# Patient Record
Sex: MALE | Race: WHITE | NOT HISPANIC OR LATINO | Employment: OTHER | URBAN - METROPOLITAN AREA
[De-identification: names, ages, dates, MRNs, and addresses within clinical notes are randomized per-mention and may not be internally consistent; named-entity substitution may affect disease eponyms.]

---

## 2020-02-21 ENCOUNTER — TRANSCRIBE ORDERS (OUTPATIENT)
Dept: ADMINISTRATIVE | Facility: HOSPITAL | Age: 30
End: 2020-02-21

## 2020-02-21 DIAGNOSIS — M25.561 RIGHT KNEE PAIN, UNSPECIFIED CHRONICITY: Primary | ICD-10-CM

## 2020-02-26 VITALS — DIASTOLIC BLOOD PRESSURE: 75 MMHG | HEART RATE: 62 BPM | HEIGHT: 63 IN | SYSTOLIC BLOOD PRESSURE: 143 MMHG

## 2020-02-26 DIAGNOSIS — S81.811A LACERATION OF RIGHT LOWER EXTREMITY, INITIAL ENCOUNTER: ICD-10-CM

## 2020-02-26 DIAGNOSIS — M25.361 KNEE INSTABILITY, RIGHT: Primary | ICD-10-CM

## 2020-02-26 PROCEDURE — 99203 OFFICE O/P NEW LOW 30 MIN: CPT | Performed by: ORTHOPAEDIC SURGERY

## 2020-02-26 RX ORDER — OXYCODONE HYDROCHLORIDE AND ACETAMINOPHEN 5; 325 MG/1; MG/1
TABLET ORAL
COMMUNITY
Start: 2020-02-19

## 2020-02-26 RX ORDER — CEPHALEXIN 500 MG/1
CAPSULE ORAL
COMMUNITY
Start: 2020-02-19

## 2020-02-26 RX ORDER — IBUPROFEN 600 MG/1
600 TABLET ORAL EVERY 6 HOURS PRN
COMMUNITY
Start: 2020-02-19

## 2020-02-26 NOTE — PROGRESS NOTES
Assessment:  1  Knee instability, right  MRI knee right  wo contrast   2  Laceration of right lower extremity, initial encounter       There is no problem list on file for this patient  Cabrera Blount presents in the office today with right knee instability after a fall from a ladder x 1 week ago  A MRI of the right knee was ordered to evaluated for a ACL tear  Incision site with sutures intact is present and stable, he will make a follow up appointment with ED to have sutures removed  Rest, ice and elevation advised  He can take NSAIDS for pain control as needed  Avoid high impact activities until seen back in the office after MRI of the right knee  Subjective:     Patient ID:    Chief Sandi Castro 26 y o  male      HPI    Patient comes in today with regards to right knee pain  He states he fell approximately 8-9 feet from a ladder landing on his left side and banging his right medial knee  The patient reports that the pain is in the medial knee and has been going on for 1 week  The pain is rated at0 at its best and4 at its worst   The pain is described as soreness  It is worsened with weight bearing and flexion, and is made better with rest   The patient has taken Ibuprofen and Hydrocodone for treatment        The following portions of the patient's history were reviewed and updated as appropriate: allergies, current medications, past family history, past social history, past surgical history and problem list         Social History     Socioeconomic History    Marital status: Single     Spouse name: Not on file    Number of children: Not on file    Years of education: Not on file    Highest education level: Not on file   Occupational History    Not on file   Social Needs    Financial resource strain: Not on file    Food insecurity:     Worry: Not on file     Inability: Not on file    Transportation needs:     Medical: Not on file     Non-medical: Not on file Tobacco Use    Smoking status: Current Every Day Smoker    Smokeless tobacco: Never Used   Substance and Sexual Activity    Alcohol use: Not on file    Drug use: Not on file    Sexual activity: Not on file   Lifestyle    Physical activity:     Days per week: Not on file     Minutes per session: Not on file    Stress: Not on file   Relationships    Social connections:     Talks on phone: Not on file     Gets together: Not on file     Attends Taoist service: Not on file     Active member of club or organization: Not on file     Attends meetings of clubs or organizations: Not on file     Relationship status: Not on file    Intimate partner violence:     Fear of current or ex partner: Not on file     Emotionally abused: Not on file     Physically abused: Not on file     Forced sexual activity: Not on file   Other Topics Concern    Not on file   Social History Narrative    Not on file     No past medical history on file  No past surgical history on file  No Known Allergies  Current Outpatient Medications on File Prior to Visit   Medication Sig Dispense Refill    cephalexin (KEFLEX) 500 mg capsule TAKE 1 CAPSULE BY MOUTH BY MOUTH 4 TIMES A DAY 7 DAYS      ibuprofen (MOTRIN) 600 mg tablet Take 600 mg by mouth every 6 (six) hours as needed      oxyCODONE-acetaminophen (PERCOCET) 5-325 mg per tablet TAKE 1 TABLET BY MOUTH EVERY 4 HOURS FOR 3 DAYS AS NEEDED FOR PAIN  AVOID OTHER SEDATIVES  No current facility-administered medications on file prior to visit  Objective:    Review of Systems   Constitutional: Negative  HENT: Negative  Eyes: Negative  Respiratory: Negative  Cardiovascular: Negative  Gastrointestinal: Negative  Endocrine: Negative  Genitourinary: Negative  Musculoskeletal: Positive for arthralgias, joint swelling and myalgias  Skin: Negative  Allergic/Immunologic: Negative  Neurological: Negative  Hematological: Negative  Psychiatric/Behavioral: Negative  Right Knee Exam     Muscle Strength   The patient has normal right knee strength  Tenderness   The patient is experiencing tenderness in the medial joint line  Range of Motion   Extension: 0   Flexion: 130 (no crepitus with rom )     Tests   Jonny:  Medial - negative Lateral - negative  Varus: negative Valgus: negative  Lachman:  Right knee anterior Lachman test: slight laxity present with end point  Drawer:  Right knee anterior drawer test: slight laxity present with end point  Posterior - negative    Other   Erythema: absent  Scars: present  Sensation: normal  Pulse: present  Swelling: none  Effusion: no effusion present    Comments:  Bounce Test:  Negative  Incision site:  Medial proximal tibia - presents CDI with sutures intact  No drainage, no dehiscence, no erythema, no sign of infection  Physical Exam   Constitutional: He is oriented to person, place, and time  He appears well-developed  HENT:   Head: Normocephalic  Eyes: Conjunctivae are normal    Neck: Neck supple  Cardiovascular: Intact distal pulses  Pulmonary/Chest: Effort normal    Musculoskeletal:        Right knee: He exhibits no effusion  Neurological: He is alert and oriented to person, place, and time  Skin: Skin is warm and dry  Psychiatric: He has a normal mood and affect  Procedures  No Procedures performed today    I have personally reviewed pertinent films in PACS and my interpretation is Xray Right Knee:  no fracture or dislocation present       Scribe Attestation    I,:   Snow Alvarenga am acting as a scribe while in the presence of the attending physician :        I,:   Casimer Ormond, DO personally performed the services described in this documentation    as scribed in my presence :          Portions of the record may have been created with voice recognition software   Occasional wrong word or "sound a like" substitutions may have occurred due to the inherent limitations of voice recognition software   Read the chart carefully and recognize, using context, where substitutions have occurred

## 2020-03-04 ENCOUNTER — HOSPITAL ENCOUNTER (OUTPATIENT)
Dept: RADIOLOGY | Facility: HOSPITAL | Age: 30
Discharge: HOME | End: 2020-03-04
Payer: COMMERCIAL

## 2020-03-04 DIAGNOSIS — M25.561 RIGHT KNEE PAIN, UNSPECIFIED CHRONICITY: ICD-10-CM

## 2020-03-04 DIAGNOSIS — M25.361 KNEE INSTABILITY, RIGHT: ICD-10-CM

## 2020-03-04 PROCEDURE — 73721 MRI JNT OF LWR EXTRE W/O DYE: CPT

## 2020-03-05 ENCOUNTER — TELEPHONE (OUTPATIENT)
Dept: OBGYN CLINIC | Facility: HOSPITAL | Age: 30
End: 2020-03-05

## 2020-03-05 NOTE — TELEPHONE ENCOUNTER
Thank you Jessi Rolon left a very detailed message on his voicemail his other phone does not work so I do not want a play Cat and mouse with him with phone calls  Just please have him come in so we can go over the results  Anybody who read this when the patient calls in please refer to the MRI results for my details

## 2020-03-05 NOTE — TELEPHONE ENCOUNTER
Patient of DR kirk  Re: Returning call  CB# [phone]    Patient states he received a call but no message was left   Unsure who called the patient or if it has reference to his MRI

## 2020-03-05 NOTE — TELEPHONE ENCOUNTER
LM on VM for patient to call back  There is a note in the MRI results that can be relayed to the patient and he needs to be scheduled with Dr Parker for review of the MRI  Please reach out to me when he calls backs so I can relay and schedule   Thank you

## 2020-03-09 ENCOUNTER — OFFICE VISIT (OUTPATIENT)
Dept: OBGYN CLINIC | Facility: CLINIC | Age: 30
End: 2020-03-09

## 2020-03-09 VITALS
HEIGHT: 63 IN | BODY MASS INDEX: 26.93 KG/M2 | DIASTOLIC BLOOD PRESSURE: 77 MMHG | HEART RATE: 71 BPM | WEIGHT: 152 LBS | SYSTOLIC BLOOD PRESSURE: 127 MMHG

## 2020-03-09 DIAGNOSIS — S83.511D RUPTURE OF ANTERIOR CRUCIATE LIGAMENT OF RIGHT KNEE, SUBSEQUENT ENCOUNTER: ICD-10-CM

## 2020-03-09 DIAGNOSIS — T14.8XXA CONTUSION OF BONE: Primary | ICD-10-CM

## 2020-03-09 PROBLEM — S83.511A RUPTURE OF ANTERIOR CRUCIATE LIGAMENT OF RIGHT KNEE: Status: ACTIVE | Noted: 2020-03-09

## 2020-03-09 PROCEDURE — 99212 OFFICE O/P EST SF 10 MIN: CPT | Performed by: ORTHOPAEDIC SURGERY

## 2020-03-09 NOTE — PROGRESS NOTES
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  Otherwise there is no further follow-up for me less the pain persists  We also went over the bone edema and told him that it can take 6 months to a year to resolve on its own  If it continues to persist or it gets worse he will come back to see me  Subjective:     Patient ID:    Chief Merritt Nuhasunita 26 y o  male      HPI    Patient comes in today with regards to   Right knee  Patient had a fall subsequently had an MRI and he comes back today to review that MRI      The following portions of the patient's history were reviewed and updated as appropriate: allergies, current medications, past family history, past social history, past surgical history and problem list         Social History     Socioeconomic History    Marital status: Single     Spouse name: Not on file    Number of children: Not on file    Years of education: Not on file    Highest education level: Not on file   Occupational History    Not on file   Social Needs    Financial resource strain: Not on file    Food insecurity:     Worry: Not on file     Inability: Not on file    Transportation needs:     Medical: Not on file     Non-medical: Not on file   Tobacco Use    Smoking status: Current Every Day Smoker    Smokeless tobacco: Never Used   Substance and Sexual Activity    Alcohol use: Not on file    Drug use: Not on file    Sexual activity: Not on file   Lifestyle    Physical activity:     Days per week: Not on file     Minutes per session: Not on file    Stress: Not on file   Relationships    Social connections:     Talks on phone: Not on file     Gets together: Not on file     Attends Church service: Not on file     Active member of club or organization: Not on file     Attends meetings of clubs or organizations: Not on file     Relationship status: Not on file    Intimate partner violence:     Fear of current or ex partner: Not on file     Emotionally abused: Not on file     Physically abused: Not on file     Forced sexual activity: Not on file   Other Topics Concern    Not on file   Social History Narrative    Not on file     History reviewed  No pertinent past medical history  History reviewed  No pertinent surgical history  No Known Allergies  Current Outpatient Medications on File Prior to Visit   Medication Sig Dispense Refill    cephalexin (KEFLEX) 500 mg capsule TAKE 1 CAPSULE BY MOUTH BY MOUTH 4 TIMES A DAY 7 DAYS      ibuprofen (MOTRIN) 600 mg tablet Take 600 mg by mouth every 6 (six) hours as needed      oxyCODONE-acetaminophen (PERCOCET) 5-325 mg per tablet TAKE 1 TABLET BY MOUTH EVERY 4 HOURS FOR 3 DAYS AS NEEDED FOR PAIN  AVOID OTHER SEDATIVES  No current facility-administered medications on file prior to visit  Objective:    Review of Systems    Right Knee Exam     Muscle Strength   The patient has normal right knee strength  Tenderness   The patient is experiencing tenderness in the medial joint line  Range of Motion   Extension: 0   Flexion: 130 (no crepitus with rom )     Tests   Jonny:  Medial - negative Lateral - negative  Varus: negative Valgus: negative  Lachman:  Right knee anterior Lachman test: slight laxity present with end point  Drawer:  Right knee anterior drawer test: slight laxity present with end point      Posterior - negative    Other   Erythema: absent  Scars: present  Sensation: normal  Pulse: present  Swelling: none  Effusion: no effusion present    Comments:  Bounce Test: Negative  Incision site:  Medial proximal tibia - presents CDI with sutures intact  No drainage, no dehiscence, no erythema, no sign of infection  Physical Exam   Musculoskeletal:        Right knee: He exhibits no effusion  Procedures    The previous exam    I have personally reviewed pertinent films in PACS  Portions of the record may have been created with voice recognition software   Occasional wrong word or "sound a like" substitutions may have occurred due to the inherent limitations of voice recognition software   Read the chart carefully and recognize, using context, where substitutions have occurred

## 2020-03-30 ENCOUNTER — NURSE TRIAGE (OUTPATIENT)
Dept: OBGYN CLINIC | Facility: HOSPITAL | Age: 30
End: 2020-03-30

## 2020-03-30 NOTE — TELEPHONE ENCOUNTER
Reason for Disposition   [1] Caller concerned that exposure to COVID-19 occurred BUT [2] does not meet COVID-19 EXPOSURE criteria from [facility]    Answer Assessment - Initial Assessment Questions  1  CONFIRMED CASE: "Who is the person with the confirmed COVID-19 infection that you were exposed to?"     Unknown  2  PLACE of CONTACT: "Where were you when you were exposed to COVID-19  (coronavirus disease 2019)?" (e g , city, state, country)     Unknown  3  TYPE of CONTACT: "How much contact was there?" (e g , live in same house, work in same office, same school)     Unknown  4  DATE of CONTACT: "When did you have contact with a coronavirus patient?" (e g , days)      Unknown  5  DURATION of CONTACT: "How long were you in contact with the COVID-19 (coronavirus disease) patient?" (e g , a few seconds, passed by person, a few minutes, live with the patient)     unknown  6  SYMPTOMS: "Do you have any symptoms?" (e g , fever, cough, breathing difficulty)     Wet cough, some congestion, Fever waxes and wanes mostly at night  Denies SOB  7  PREGNANCY OR POSTPARTUM: "Is there any chance you are pregnant?" "When was your last menstrual period?" "Did you deliver in the last 2 weeks?"    N/A  8  HIGH RISK: "Do you have any heart or lung problems?  Do you have a weakened immune system?" (e g , CHF, COPD, asthma, HIV positive, chemotherapy, renal failure, diabetes mellitus, sickle cell anemia)     No history    Protocols used: CORONAVIRUS (COVID-19) EXPOSURE-ADULT-

## 2020-03-30 NOTE — TELEPHONE ENCOUNTER
Regarding: AARCJ-14 CONCERN  ----- Message from Shelton Blas sent at 3/30/2020  4:13 PM EDT -----  "I have had a spiked fever for 2 days, of up to 100 7  I have had a cough, and some congestion   No shortness of breath "

## 2022-12-01 ENCOUNTER — OFFICE VISIT (OUTPATIENT)
Dept: INTERNAL MEDICINE CLINIC | Facility: CLINIC | Age: 32
End: 2022-12-01

## 2022-12-01 VITALS
BODY MASS INDEX: 23.6 KG/M2 | WEIGHT: 133.2 LBS | SYSTOLIC BLOOD PRESSURE: 130 MMHG | OXYGEN SATURATION: 100 % | HEART RATE: 62 BPM | TEMPERATURE: 98.3 F | HEIGHT: 63 IN | DIASTOLIC BLOOD PRESSURE: 80 MMHG

## 2022-12-01 DIAGNOSIS — G89.29 CHRONIC LOW BACK PAIN, UNSPECIFIED BACK PAIN LATERALITY, UNSPECIFIED WHETHER SCIATICA PRESENT: ICD-10-CM

## 2022-12-01 DIAGNOSIS — Z72.0 TOBACCO ABUSE: ICD-10-CM

## 2022-12-01 DIAGNOSIS — Z11.59 NEED FOR HEPATITIS C SCREENING TEST: ICD-10-CM

## 2022-12-01 DIAGNOSIS — Z13.6 SCREENING FOR CARDIOVASCULAR CONDITION: ICD-10-CM

## 2022-12-01 DIAGNOSIS — Z13.1 SCREENING FOR DIABETES MELLITUS: ICD-10-CM

## 2022-12-01 DIAGNOSIS — Z11.4 SCREENING FOR HIV (HUMAN IMMUNODEFICIENCY VIRUS): ICD-10-CM

## 2022-12-01 DIAGNOSIS — Z00.00 ANNUAL PHYSICAL EXAM: Primary | ICD-10-CM

## 2022-12-01 DIAGNOSIS — M54.50 CHRONIC LOW BACK PAIN, UNSPECIFIED BACK PAIN LATERALITY, UNSPECIFIED WHETHER SCIATICA PRESENT: ICD-10-CM

## 2022-12-01 RX ORDER — VARENICLINE TARTRATE 25 MG
KIT ORAL
Qty: 53 EACH | Refills: 0 | Status: SHIPPED | OUTPATIENT
Start: 2022-12-01 | End: 2023-01-07

## 2022-12-01 RX ORDER — CAPSAICIN 0.025 %
1 CREAM (GRAM) TOPICAL 2 TIMES DAILY
Qty: 60 G | Refills: 0 | Status: SHIPPED | OUTPATIENT
Start: 2022-12-01

## 2022-12-01 NOTE — ASSESSMENT & PLAN NOTE
· He is interested in quitting smoking but has not set a quit date yet  · Ordered chantix started pack and nicotine patch 7 mg for him   Counseled on combining these measure has higher success rate  · Refer him to smoking cessation program

## 2022-12-01 NOTE — ASSESSMENT & PLAN NOTE
· Assessment and plan: initial annual wellness examination overall the patient is clinically stable and doing well, we encouraged the patient to follow a healthy and balanced diet  We recommend that the patient exercise routinely approximately 30 minutes 5 times per week   We have reviewed the patient's vaccines and have made recommendations for updates if necessary  We will be ordering screening laboratories which are age appropriate, including CBC, CMP, lipid panel, HIV, Hep C screening  Return to the office in 12 months, call if any problems

## 2022-12-01 NOTE — ASSESSMENT & PLAN NOTE
· Chronic lower back pain for 5 years with no radiation, muscle weakness or sensory deficit  · Point tenderness noted between L3-L5  · Denied fever, chills or hx of cancer     · Likely paraspinal muscle strain  · Ordered capsaicin cream for him for pain management  · Recommend continuing stretching exercises  · Follow up as needed

## 2022-12-01 NOTE — PATIENT INSTRUCTIONS

## 2022-12-01 NOTE — PROGRESS NOTES
[facility]IC[facility] [place]    NAME: Beatris Meadows  AGE: 34 y o  SEX: male  : 1990     DATE: 2022     Assessment and Plan:     Problem List Items Addressed This Visit        Other    Annual physical exam - Primary     · Assessment and plan: initial annual wellness examination overall the patient is clinically stable and doing well, we encouraged the patient to follow a healthy and balanced diet  We recommend that the patient exercise routinely approximately 30 minutes 5 times per week   We have reviewed the patient's vaccines and have made recommendations for updates if necessary  We will be ordering screening laboratories which are age appropriate, including CBC, CMP, lipid panel, HIV, Hep C screening  Return to the office in 12 months, call if any problems  Screening for diabetes mellitus    Relevant Orders    Comprehensive metabolic panel    Screening for cardiovascular condition    Relevant Orders    CBC and Platelet    Lipid panel    Screening for HIV (human immunodeficiency virus)    Relevant Orders    HIV 1/2 Antigen/Antibody (4th Generation) w Reflex [facility]    Need for hepatitis C screening test    Relevant Orders    Hepatitis C antibody    Tobacco abuse     · He is interested in quitting smoking but has not set a quit date yet  · Ordered chantix started pack and nicotine patch 7 mg for him  Counseled on combining these measure has higher success rate  · Refer him to smoking cessation program             Relevant Medications    varenicline 0 5 MG X 11 & 1 MG X 42 tablet therapy pack    nicotine (NICODERM CQ) 7 mg/24hr TD 24 hr patch    Other Relevant Orders    Ambulatory Referral to Smoking Cessation Program    Lower back pain     · Chronic lower back pain for 5 years with no radiation, muscle weakness or sensory deficit  · Point tenderness noted between L3-L5  · Denied fever, chills or hx of cancer  · Likely paraspinal muscle strain  · Ordered capsaicin cream for him for pain management  · Recommend continuing stretching exercises  · Follow up as needed         Relevant Medications    capsaicin (ZOSTRIX) 0 025 % cream       Immunizations and preventive care screenings were discussed with patient today  Appropriate education was printed on patient's after visit summary  Counseling:  Alcohol/drug use: discussed moderation in alcohol intake, the recommendations for healthy alcohol use, and avoidance of illicit drug use  Dental Health: discussed importance of regular tooth brushing, flossing, and dental visits  Injury prevention: discussed safety/seat belts, safety helmets, smoke detectors, carbon dioxide detectors, and smoking near bedding or upholstery  · Exercise: the importance of regular exercise/physical activity was discussed  Recommend exercise 3-5 times per week for at least 30 minutes  Depression Screening and Follow-up Plan: Patient was screened for depression during today's encounter  They screened negative with a PHQ-2 score of 0  Tobacco Cessation Counseling: Tobacco cessation counseling was provided  Medication options discussed  Bupropion SR and nicotine patch was prescribed  Return in 1 year (on 12/1/2023)  Chief Complaint:     Chief Complaint   Patient presents with   • Annual Exam     Establish care, back pain concerns      History of Present Illness:     Adult Annual Physical   Patient here for a comprehensive physical exam  The patient reports problems - lower back pain  Diet and Physical Activity  · Diet/Nutrition: well balanced diet  · Exercise: 5-7 times a week on average  Depression Screening  PHQ-2/9 Depression Screening    Little interest or pleasure in doing things: 0 - not at all  Feeling down, depressed, or hopeless: 0 - not at all  PHQ-2 Score: 0  PHQ-2 Interpretation: Negative depression screen       General Health  · Sleep: sleeps well  · Hearing: normal - bilateral   · Vision: vision problems: near sighted  will self refer to optometrist    · Dental: regular dental visits   Health  · History of STDs?: no      Review of Systems:     Review of Systems   Constitutional: Negative for chills and fever  HENT: Negative for ear pain and sore throat  Eyes: Negative for pain and visual disturbance  Respiratory: Negative for cough and shortness of breath  Cardiovascular: Negative for chest pain and palpitations  Gastrointestinal: Negative for abdominal pain and vomiting  Genitourinary: Negative for dysuria and hematuria  Musculoskeletal: Positive for back pain  Negative for arthralgias  Skin: Negative for color change and rash  Neurological: Negative for seizures and syncope  All other systems reviewed and are negative  Past Medical History:     History reviewed  No pertinent past medical history  Past Surgical History:     History reviewed  No pertinent surgical history  Social History:     Social History     Socioeconomic History   • Marital status: Single     Spouse name: None   • Number of children: None   • Years of education: None   • Highest education level: None   Occupational History   • None   Tobacco Use   • Smoking status: Every Day   • Smokeless tobacco: Never   Vaping Use   • Vaping Use: Never used   Substance and Sexual Activity   • Alcohol use:  Yes     Alcohol/week: 3 0 standard drinks     Types: 3 Cans of beer per week   • Drug use: Yes     Types: Marijuana   • Sexual activity: None   Other Topics Concern   • None   Social History Narrative   • None     Social Determinants of Health     Financial Resource Strain: Not on file   Food Insecurity: Not on file   Transportation Needs: Not on file   Physical Activity: Not on file   Stress: Not on file   Social Connections: Not on file   Intimate Partner Violence: Not on file   Housing Stability: Not on file      Family History:     Family History   Problem Relation Age of Onset   • Cancer Paternal Grandmother    • Cancer Paternal Grandfather    • Cancer Maternal Aunt    • Cervical cancer Paternal Aunt       Current Medications:     Current Outpatient Medications   Medication Sig Dispense Refill   • capsaicin (ZOSTRIX) 0 025 % cream Apply 1 application topically 2 (two) times a day 60 g 0   • nicotine (NICODERM CQ) 7 mg/24hr TD 24 hr patch Place 1 patch on the skin every 24 hours 28 patch 0   • varenicline 0 5 MG X 11 & 1 MG X 42 tablet therapy pack Take 0 5 mg by mouth daily for 3 days, THEN 0 5 mg 2 (two) times a day for 4 days, THEN 1 mg 2 (two) times a day  53 each 0   • cephalexin (KEFLEX) 500 mg capsule TAKE 1 CAPSULE BY MOUTH BY MOUTH 4 TIMES A DAY 7 DAYS (Patient not taking: Reported on 12/1/2022)     • ibuprofen (MOTRIN) 600 mg tablet Take 600 mg by mouth every 6 (six) hours as needed (Patient not taking: Reported on 12/1/2022)     • oxyCODONE-acetaminophen (PERCOCET) 5-325 mg per tablet TAKE 1 TABLET BY MOUTH EVERY 4 HOURS FOR 3 DAYS AS NEEDED FOR PAIN  AVOID OTHER SEDATIVES  (Patient not taking: Reported on 12/1/2022)       No current facility-administered medications for this visit  Allergies:     No Known Allergies   Physical Exam:     /80 (BP Location: Left arm, Patient Position: Sitting, Cuff Size: Standard)   Pulse 62   Temp 98 3 °F (36 8 °C) (Tympanic)   Ht 5' 3" (1 6 m)   Wt 60 4 kg (133 lb 3 2 oz)   SpO2 100%   BMI 23 60 kg/m²     Physical Exam  Vitals and nursing note reviewed  Constitutional:       Appearance: Normal appearance  HENT:      Head: Atraumatic  Eyes:      Conjunctiva/sclera: Conjunctivae normal    Cardiovascular:      Rate and Rhythm: Normal rate  Pulses: Normal pulses  Pulmonary:      Effort: Pulmonary effort is normal  No respiratory distress  Musculoskeletal:      Lumbar back: Tenderness (paraspinal muscle tenderness ) and bony tenderness (midline tenderness) present     Skin:     General: Skin is warm and dry  Coloration: Skin is not jaundiced  Neurological:      Mental Status: He is alert and oriented to person, place, and time  Psychiatric:         Mood and Affect: Mood normal          Behavior: Behavior normal                 Physical Activity Assessment and Intervention:      Other interventions: He works with [employer] and [employer] Substance Assessment and Intervention:     Tobacco use screening performed    Alcohol and drug use screening performed      Other interventions: He smoked 0 5 PPD a day  Drink alcohol socially  5 drinks is his maximum        Candi Patricia MD   [facility] INTERNAL MEDICINE [place]

## 2023-01-30 PROBLEM — Z11.59 NEED FOR HEPATITIS C SCREENING TEST: Status: RESOLVED | Noted: 2022-12-01 | Resolved: 2023-01-30

## 2023-01-30 PROBLEM — Z13.6 SCREENING FOR CARDIOVASCULAR CONDITION: Status: RESOLVED | Noted: 2022-12-01 | Resolved: 2023-01-30

## 2023-01-30 PROBLEM — Z13.1 SCREENING FOR DIABETES MELLITUS: Status: RESOLVED | Noted: 2022-12-01 | Resolved: 2023-01-30

## 2023-04-13 DIAGNOSIS — E78.5 DYSLIPIDEMIA: Primary | ICD-10-CM

## 2023-04-30 ENCOUNTER — APPOINTMENT (OUTPATIENT)
Dept: RADIOLOGY | Facility: CLINIC | Age: 33
End: 2023-04-30

## 2023-04-30 ENCOUNTER — OFFICE VISIT (OUTPATIENT)
Dept: URGENT CARE | Facility: CLINIC | Age: 33
End: 2023-04-30

## 2023-04-30 VITALS
RESPIRATION RATE: 16 BRPM | SYSTOLIC BLOOD PRESSURE: 118 MMHG | HEART RATE: 83 BPM | OXYGEN SATURATION: 100 % | TEMPERATURE: 98 F | DIASTOLIC BLOOD PRESSURE: 65 MMHG

## 2023-04-30 DIAGNOSIS — R06.2 WHEEZING: ICD-10-CM

## 2023-04-30 DIAGNOSIS — J20.9 ACUTE BRONCHITIS, UNSPECIFIED ORGANISM: Primary | ICD-10-CM

## 2023-04-30 DIAGNOSIS — J01.90 ACUTE SINUSITIS, RECURRENCE NOT SPECIFIED, UNSPECIFIED LOCATION: ICD-10-CM

## 2023-04-30 RX ORDER — ALBUTEROL SULFATE 90 UG/1
2 AEROSOL, METERED RESPIRATORY (INHALATION) EVERY 6 HOURS PRN
Qty: 8.5 G | Refills: 0 | Status: SHIPPED | OUTPATIENT
Start: 2023-04-30

## 2023-04-30 RX ORDER — DOXYCYCLINE 100 MG/1
100 TABLET ORAL 2 TIMES DAILY
Qty: 14 TABLET | Refills: 0 | Status: SHIPPED | OUTPATIENT
Start: 2023-04-30 | End: 2023-05-07

## 2023-04-30 NOTE — PROGRESS NOTES
[facility]        NAME: Abhishek Felix is a 34 y o  male  : 1990    MRN: [id number]  DATE: 2023  TIME: 9:03 AM    Assessment and Plan   Acute bronchitis, unspecified organism [J20 9]  1  Acute bronchitis, unspecified organism  XR chest pa & lateral    doxycycline (ADOXA) 100 MG tablet    albuterol (ProAir HFA) 90 mcg/act inhaler      2  Acute sinusitis, recurrence not specified, unspecified location  doxycycline (ADOXA) 100 MG tablet      Patient presents with URI symptoms  On exam he has wheezing on auscultation of the lungs with inspiration  Both before and after clearing cough recommend chest x-ray for further evaluation  Chest x-ray demonstrates some hilar adenopathy with bronchial thickening consistent with bronchitis  Patient yesterday and symptoms and tenderness over the maxillary sinus along with reported fevers and chills consistent with bacterial sinus infection  Patient will be started on doxycycline to treat both sinus and lung pathogens  Patient is also provided with albuterol inhaler for shortness of breath  Patient Instructions   There are no Patient Instructions on file for this visit  Follow up with PCP in 3-5 days  Proceed to  ER if symptoms worsen  Chief Complaint     Chief Complaint   Patient presents with    Cough     Cough congestion SOB for 11 days  States he possibly has had a fever, chills,          History of Present Illness       50-year-old male presents with complaint of runny nose, congestion, and cough for approximately 11 days  Patient reports feeling short of breath particular with activity as well  He denies any chest pain  Patient reports that he has felt feverish at night but has not actually checked his temperature and also notes chills at night  He notes body aches and fatigue as well  He is concerned for possible pneumonia  Review of Systems   Review of Systems   Constitutional: Positive for chills, fatigue and fever  HENT: Positive for congestion, postnasal drip, rhinorrhea, sinus pressure and sinus pain  Negative for sore throat, trouble swallowing and voice change  Respiratory: Positive for cough, chest tightness and shortness of breath  Cardiovascular: Negative for chest pain  Gastrointestinal: Negative for diarrhea, nausea and vomiting  Musculoskeletal: Positive for myalgias  Neurological: Positive for headaches  Current Medications       Current Outpatient Medications:     albuterol (ProAir HFA) 90 mcg/act inhaler, Inhale 2 puffs every 6 (six) hours as needed for wheezing, Disp: 8 5 g, Rfl: 0    doxycycline (ADOXA) 100 MG tablet, Take 1 tablet (100 mg total) by mouth 2 (two) times a day for 7 days, Disp: 14 tablet, Rfl: 0    ibuprofen (MOTRIN) 600 mg tablet, Take 600 mg by mouth every 6 (six) hours as needed, Disp: , Rfl:     capsaicin (ZOSTRIX) 0 025 % cream, Apply 1 application topically 2 (two) times a day (Patient not taking: Reported on 4/30/2023), Disp: 60 g, Rfl: 0    cephalexin (KEFLEX) 500 mg capsule, TAKE 1 CAPSULE BY MOUTH BY MOUTH 4 TIMES A DAY 7 DAYS (Patient not taking: Reported on 12/1/2022), Disp: , Rfl:     nicotine (NICODERM CQ) 7 mg/24hr TD 24 hr patch, Place 1 patch on the skin every 24 hours (Patient not taking: Reported on 4/30/2023), Disp: 28 patch, Rfl: 0    oxyCODONE-acetaminophen (PERCOCET) 5-325 mg per tablet, TAKE 1 TABLET BY MOUTH EVERY 4 HOURS FOR 3 DAYS AS NEEDED FOR PAIN  AVOID OTHER SEDATIVES   (Patient not taking: Reported on 12/1/2022), Disp: , Rfl:     varenicline 0 5 MG X 11 & 1 MG X 42 tablet therapy pack, Take 0 5 mg by mouth daily for 3 days, THEN 0 5 mg 2 (two) times a day for 4 days, THEN 1 mg 2 (two) times a day , Disp: 53 each, Rfl: 0    Current Allergies     Allergies as of 04/30/2023    (No Known Allergies)            The following portions of the patient's history were reviewed and updated as appropriate: allergies, current medications, past family history, past medical history, past social history, past surgical history and problem list      No past medical history on file  No past surgical history on file  Family History   Problem Relation Age of Onset    Cancer Paternal Grandmother     Cancer Paternal Grandfather     Cancer Maternal Aunt     Cervical cancer Paternal Aunt          Medications have been verified  Objective   /65   Pulse 83   Temp 98 °F (36 7 °C) (Temporal)   Resp 16   SpO2 100%   No LMP for male patient  Physical Exam     Physical Exam  Vitals and nursing note reviewed  Constitutional:       General: He is not in acute distress  Appearance: Normal appearance  He is not ill-appearing or toxic-appearing  HENT:      Head: Normocephalic and atraumatic  Jaw: No trismus  Right Ear: Tympanic membrane, ear canal and external ear normal  There is no impacted cerumen  No foreign body  Left Ear: Tympanic membrane, ear canal and external ear normal  There is no impacted cerumen  No foreign body  Nose: Mucosal edema, congestion and rhinorrhea present  No nasal deformity  Rhinorrhea is purulent  Right Nostril: No foreign body, epistaxis or occlusion  Left Nostril: No foreign body, epistaxis or occlusion  Right Turbinates: Not enlarged, swollen or pale  Left Turbinates: Not enlarged, swollen or pale  Right Sinus: No maxillary sinus tenderness or frontal sinus tenderness  Left Sinus: Maxillary sinus tenderness present  No frontal sinus tenderness  Mouth/Throat:      Lips: Pink  No lesions  Mouth: Mucous membranes are moist  No injury, oral lesions or angioedema  Dentition: Normal dentition  Tongue: No lesions  Tongue does not deviate from midline  Palate: No mass and lesions  Pharynx: Uvula midline  Pharyngeal swelling and posterior oropharyngeal erythema present  No oropharyngeal exudate or uvula swelling        Tonsils: No tonsillar "exudate or tonsillar abscesses  Comments: Postnasal drip present with mild erythema and swelling of the posterior oropharynx  Eyes:      General: Lids are normal  Gaze aligned appropriately  No allergic shiner  Extraocular Movements: Extraocular movements intact  Cardiovascular:      Rate and Rhythm: Normal rate and regular rhythm  Heart sounds: Normal heart sounds, S1 normal and S2 normal  Heart sounds not distant  No murmur heard  No friction rub  No gallop  Pulmonary:      Effort: Pulmonary effort is normal       Breath sounds: Examination of the right-middle field reveals wheezing  Examination of the left-middle field reveals wheezing  Examination of the right-lower field reveals wheezing  Examination of the left-lower field reveals wheezing  Wheezing present  No decreased breath sounds, rhonchi or rales  Comments: Patient speaking in full sentences with no increased respiratory effort  No audible wheezing or stridor  Lymphadenopathy:      Cervical: Cervical adenopathy present  Right cervical: Superficial cervical adenopathy present  No deep or posterior cervical adenopathy  Left cervical: Superficial cervical adenopathy present  No deep or posterior cervical adenopathy  Skin:     General: Skin is warm and dry  Neurological:      Mental Status: He is alert and oriented to person, place, and time  Coordination: Coordination is intact  Gait: Gait is intact  Psychiatric:         Attention and Perception: Attention and perception normal          Mood and Affect: Mood and affect normal          Speech: Speech normal          Behavior: Behavior is cooperative  Note: Portions of this record may have been created with voice recognition software  Occasional wrong word or \"sound a like\" substitutions may have occurred due to the inherent limitations of voice recognition software   Please read the chart carefully and recognize, using context, where " substitutions have occurred  *

## 2023-04-30 NOTE — PATIENT INSTRUCTIONS
Take doxycycline as prescribed  Use albuterol inhaler as needed for any shortness of breath or wheezing  Symptoms do not improve in 2 to 3 days follow-up with PCP  If symptoms worsen report to the emergency room immediately

## 2023-09-05 ENCOUNTER — TRANSCRIBE ORDERS (OUTPATIENT)
Dept: LAB | Facility: CLINIC | Age: 33
End: 2023-09-05

## 2023-09-05 ENCOUNTER — APPOINTMENT (OUTPATIENT)
Dept: LAB | Facility: CLINIC | Age: 33
End: 2023-09-05

## 2023-09-05 DIAGNOSIS — Z00.8 HEALTH EXAMINATION IN POPULATION SURVEY: ICD-10-CM

## 2023-09-05 DIAGNOSIS — Z00.8 HEALTH EXAMINATION IN POPULATION SURVEY: Primary | ICD-10-CM

## 2023-09-05 LAB
CHOLEST SERPL-MCNC: 211 MG/DL
HDLC SERPL-MCNC: 53 MG/DL
LDLC SERPL CALC-MCNC: 134 MG/DL (ref 0–100)
NONHDLC SERPL-MCNC: 158 MG/DL
TRIGL SERPL-MCNC: 122 MG/DL

## 2023-09-05 PROCEDURE — 83036 HEMOGLOBIN GLYCOSYLATED A1C: CPT

## 2023-09-05 PROCEDURE — 36415 COLL VENOUS BLD VENIPUNCTURE: CPT

## 2023-09-05 PROCEDURE — 80061 LIPID PANEL: CPT

## 2023-09-06 LAB
EST. AVERAGE GLUCOSE BLD GHB EST-MCNC: 120 MG/DL
HBA1C MFR BLD: 5.8 %

## 2024-03-04 ENCOUNTER — OFFICE VISIT (OUTPATIENT)
Dept: INTERNAL MEDICINE CLINIC | Facility: CLINIC | Age: 34
End: 2024-03-04
Payer: COMMERCIAL

## 2024-03-04 VITALS
OXYGEN SATURATION: 97 % | SYSTOLIC BLOOD PRESSURE: 100 MMHG | WEIGHT: 131.8 LBS | HEART RATE: 70 BPM | TEMPERATURE: 97.4 F | BODY MASS INDEX: 23.35 KG/M2 | DIASTOLIC BLOOD PRESSURE: 60 MMHG | HEIGHT: 63 IN

## 2024-03-04 DIAGNOSIS — Z00.00 ANNUAL PHYSICAL EXAM: Primary | ICD-10-CM

## 2024-03-04 DIAGNOSIS — Z72.0 TOBACCO ABUSE: ICD-10-CM

## 2024-03-04 DIAGNOSIS — E78.6 HYPOCHOLESTEROLEMIA: ICD-10-CM

## 2024-03-04 PROCEDURE — 99395 PREV VISIT EST AGE 18-39: CPT

## 2024-03-04 PROCEDURE — 99406 BEHAV CHNG SMOKING 3-10 MIN: CPT

## 2024-03-04 RX ORDER — BUPROPION HYDROCHLORIDE 150 MG/1
150 TABLET ORAL EVERY MORNING
Qty: 30 TABLET | Refills: 3 | Status: SHIPPED | OUTPATIENT
Start: 2024-03-04 | End: 2024-08-31

## 2024-03-04 NOTE — ASSESSMENT & PLAN NOTE
Patient smoke 1/2 pack a day and has been smoking for the past severn year  Last clinic visit prescribedVarenicline   Patient is reporting he would like to try to cut back  Discussed at length complications of tobacco, reinforced tobacco cessation  Discussed medications to help cut down cravings including  Wellbutrin.  Discussed medication side effects and patient is willing to start.  Patient has no contraindications to starting Wellbutrin.     Plan   Wellbutrin 150 mg daily 50 mg  Will associated nicotine patch 7 mg/every 24 hours  Referred tobacco cessation program

## 2024-03-04 NOTE — PROGRESS NOTES
ADULT ANNUAL PHYSICAL  [facility] [place]    NAME: Ryan Lu  AGE: 33 y.o. SEX: male  : 1990     DATE: 3/4/2024     Assessment and Plan:     Problem List Items Addressed This Visit     Annual physical exam - Primary    Relevant Orders    Lipid panel    Hemoglobin A1C    Comprehensive metabolic panel    CBC and differential    TSH, 3rd generation with Free T4 reflex    Vitamin D 25 hydroxy    Tobacco abuse     Patient smoke 1/2 pack a day and has been smoking for the past severn year  Last clinic visit prescribedVarenicline   Patient is reporting he would like to try to cut back  Discussed at length complications of tobacco, reinforced tobacco cessation  Discussed medications to help cut down cravings including  Wellbutrin.  Discussed medication side effects and patient is willing to start.  Patient has no contraindications to starting Wellbutrin.     Plan   Wellbutrin 150 mg daily 50 mg  Will associated nicotine patch 7 mg/every 24 hours  Referred tobacco cessation program             Relevant Medications    nicotine (NICODERM CQ) 7 mg/24hr TD 24 hr patch    buPROPion (WELLBUTRIN XL) 150 mg 24 hr tablet    Other Relevant Orders    Ambulatory Referral to Smoking Cessation Program    Hypocholesterolemia     Last lipid panel, .-Total cholesterol 211, L[facility]    Plan   Repeat lipid panel and assess cardiovascular risk                  Ryan is 34 yo with 34 yo who is here for annual physical   He works as [employer], enjoys his work. He is father of four children and oldest being 13 years and 4 month old.   He Is pretty active and tries to keep active.  Good part of our discussion was reinforcing good behavior and discussing tobacco cessation    Immunizations and preventive care screenings were discussed with patient today. Appropriate education was printed on patient's after visit summary.    Counseling:  Alcohol/drug use: discussed moderation in  alcohol intake, the recommendations for healthy alcohol use, and avoidance of illicit drug use.  Dental Health: discussed importance of regular tooth brushing, flossing, and dental visits.  Injury prevention: discussed safety/seat belts, safety helmets, smoke detectors, carbon dioxide detectors, and smoking near bedding or upholstery.  Sexual health: discussed sexually transmitted diseases, partner selection, use of condoms, avoidance of unintended pregnancy, and contraceptive alternatives.  Exercise: the importance of regular exercise/physical activity was discussed. Recommend exercise 3-5 times per week for at least 30 minutes.       Depression Screening and Follow-up Plan: Patient was screened for depression during today's encounter. They screened negative with a PHQ-2 score of 0.    Tobacco Cessation Counseling: Tobacco cessation counseling was provided. The patient is sincerely urged to quit consumption of tobacco. He is ready to quit tobacco. Medication options and side effects of medication discussed. Bupropion SR and nicotine patch was prescribed.         Return in about 2 months (around 5/4/2024).     Chief Complaint:     Chief Complaint   Patient presents with   • Physical Exam   • Neck Pain      History of Present Illness:     Adult Annual Physical   Patient here for a comprehensive physical exam. The patient reports no problems.    Diet and Physical Activity  Diet/Nutrition: well balanced diet.   Exercise: moderate cardiovascular exercise and strength training exercises.      Depression Screening  PHQ-2/9 Depression Screening    Little interest or pleasure in doing things: 0 - not at all  Feeling down, depressed, or hopeless: 0 - not at all  PHQ-2 Score: 0  PHQ-2 Interpretation: Negative depression screen       General Health  Sleep: gets 7-8 hours of sleep on average.   Hearing: normal - none .  Vision: vision problems: myopia has corrective glasses .   Dental: no dental visits for >1 year and discussed  with patient to follow up with dentist .        Health  History of STDs?: no.    Advanced Care Planning  Do you have an advanced directive? no  Do you have a durable medical power of ? no  ACP document given to the patient? no     Review of Systems:     Review of Systems   Constitutional:  Negative for chills and fever.   HENT:  Negative for ear pain and sore throat.    Eyes:  Negative for pain and visual disturbance.   Respiratory:  Negative for cough and shortness of breath.    Cardiovascular:  Negative for chest pain and palpitations.   Gastrointestinal:  Negative for abdominal pain and vomiting.   Genitourinary:  Negative for dysuria and hematuria.   Musculoskeletal:  Negative for arthralgias and back pain.   Skin:  Negative for color change and rash.   Neurological:  Negative for seizures and syncope.   All other systems reviewed and are negative.     Past Medical History:     No past medical history on file.   Past Surgical History:     No past surgical history on file.   Social History:     Social History     Socioeconomic History   • Marital status: Single     Spouse name: None   • Number of children: None   • Years of education: None   • Highest education level: None   Occupational History   • None   Tobacco Use   • Smoking status: Every Day   • Smokeless tobacco: Never   Vaping Use   • Vaping status: Never Used   Substance and Sexual Activity   • Alcohol use: Yes     Alcohol/week: 3.0 standard drinks of alcohol     Types: 3 Cans of beer per week   • Drug use: Yes     Types: Marijuana   • Sexual activity: None   Other Topics Concern   • None   Social History Narrative   • None     Social Determinants of Health     Financial Resource Strain: Not on file   Food Insecurity: Not on file   Transportation Needs: Not on file   Physical Activity: Sufficiently Active (3/4/2024)    Exercise Vital Sign    • Days of Exercise per Week: 3 days    • Minutes of Exercise per Session: 90 min   Stress: No Stress  "Concern Present (3/4/2024)    [employer] [place] [employer] - Occupational Stress Questionnaire    • Feeling of Stress : Only a little   Social Connections: Moderately Integrated (3/4/2024)    Social Connection and Isolation Panel [NHANES]    • Frequency of Communication with Friends and Family: More than three times a week    • Frequency of Social Gatherings with Friends and Family: More than three times a week    • Attends [employer] Services: Never    • Active Member of Clubs or Organizations: Yes    • Attends Club or Organization Meetings: More than 4 times per year    • Marital Status:    Intimate Partner Violence: Not At Risk (3/4/2024)    Humiliation, Afraid, Rape, and Kick questionnaire    • Fear of Current or Ex-Partner: No    • Emotionally Abused: No    • Physically Abused: No    • Sexually Abused: No   Housing Stability: Not on file      Family History:     Family History   Problem Relation Age of Onset   • Cancer Paternal Grandmother    • Cancer Paternal Grandfather    • Cancer Maternal Aunt    • Cervical cancer Paternal Aunt       Current Medications:     Current Outpatient Medications   Medication Sig Dispense Refill   • buPROPion (WELLBUTRIN XL) 150 mg 24 hr tablet Take 1 tablet (150 mg total) by mouth every morning 30 tablet 3   • nicotine (NICODERM CQ) 7 mg/24hr TD 24 hr patch Place 1 patch on the skin over 16 hours every 24 hours 28 patch 0   • varenicline 0.5 MG X 11 & 1 MG X 42 tablet therapy pack Take 0.5 mg by mouth daily for 3 days, THEN 0.5 mg 2 (two) times a day for 4 days, THEN 1 mg 2 (two) times a day. (Patient not taking: Reported on 3/4/2024) 53 each 0     No current facility-administered medications for this visit.      Allergies:     No Known Allergies   Physical Exam:     /60 (BP Location: Left arm, Patient Position: Sitting, Cuff Size: Large)   Pulse 70   Temp (!) 97.4 °F (36.3 °C) (Tympanic)   Ht 5' 2.5\" (1.588 m)   Wt 59.8 kg (131 lb 12.8 oz)   SpO2 97%  "  BMI 23.72 kg/m²     Physical Exam  Vitals and nursing note reviewed.   Constitutional:       General: He is not in acute distress.     Appearance: He is well-developed.   HENT:      Head: Normocephalic and atraumatic.   Eyes:      Conjunctiva/sclera: Conjunctivae normal.   Cardiovascular:      Rate and Rhythm: Normal rate and regular rhythm.      Heart sounds: No murmur heard.  Pulmonary:      Effort: Pulmonary effort is normal. No respiratory distress.      Breath sounds: Normal breath sounds.   Abdominal:      Palpations: Abdomen is soft.      Tenderness: There is no abdominal tenderness.   Musculoskeletal:         General: No swelling.      Cervical back: Neck supple.      Right lower leg: No edema.      Left lower leg: No edema.   Skin:     General: Skin is warm and dry.      Capillary Refill: Capillary refill takes less than 2 seconds.   Neurological:      Mental Status: He is alert.   Psychiatric:         Mood and Affect: Mood normal.          Amy Schneider MD   [facility] [place]

## 2024-03-05 ENCOUNTER — PATIENT OUTREACH (OUTPATIENT)
Dept: OTHER | Facility: CLINIC | Age: 34
End: 2024-03-05

## 2024-04-18 ENCOUNTER — APPOINTMENT (OUTPATIENT)
Dept: LAB | Facility: CLINIC | Age: 34
End: 2024-04-18
Payer: COMMERCIAL

## 2024-04-18 DIAGNOSIS — Z00.00 ANNUAL PHYSICAL EXAM: ICD-10-CM

## 2024-04-18 LAB
ALBUMIN SERPL BCP-MCNC: 4.5 G/DL (ref 3.5–5)
ALP SERPL-CCNC: 54 U/L (ref 34–104)
ALT SERPL W P-5'-P-CCNC: 28 U/L (ref 7–52)
ANION GAP SERPL CALCULATED.3IONS-SCNC: 9 MMOL/L (ref 4–13)
AST SERPL W P-5'-P-CCNC: 20 U/L (ref 13–39)
BASOPHILS # BLD AUTO: 0.04 THOUSANDS/ÂΜL (ref 0–0.1)
BASOPHILS NFR BLD AUTO: 1 % (ref 0–1)
BILIRUB SERPL-MCNC: 0.51 MG/DL (ref 0.2–1)
BUN SERPL-MCNC: 12 MG/DL (ref 5–25)
CALCIUM SERPL-MCNC: 9.7 MG/DL (ref 8.4–10.2)
CHLORIDE SERPL-SCNC: 101 MMOL/L (ref 96–108)
CHOLEST SERPL-MCNC: 217 MG/DL
CO2 SERPL-SCNC: 26 MMOL/L (ref 21–32)
CREAT SERPL-MCNC: 0.89 MG/DL (ref 0.6–1.3)
EOSINOPHIL # BLD AUTO: 0.06 THOUSAND/ÂΜL (ref 0–0.61)
EOSINOPHIL NFR BLD AUTO: 2 % (ref 0–6)
ERYTHROCYTE [DISTWIDTH] IN BLOOD BY AUTOMATED COUNT: 12.6 % (ref 11.6–15.1)
EST. AVERAGE GLUCOSE BLD GHB EST-MCNC: 120 MG/DL
GFR SERPL CREATININE-BSD FRML MDRD: 111 ML/MIN/1.73SQ M
GLUCOSE P FAST SERPL-MCNC: 87 MG/DL (ref 65–99)
HBA1C MFR BLD: 5.8 %
HCT VFR BLD AUTO: 45.6 % (ref 36.5–49.3)
HDLC SERPL-MCNC: 49 MG/DL
HGB BLD-MCNC: 15.5 G/DL (ref 12–17)
IMM GRANULOCYTES # BLD AUTO: 0 THOUSAND/UL (ref 0–0.2)
IMM GRANULOCYTES NFR BLD AUTO: 0 % (ref 0–2)
LDLC SERPL CALC-MCNC: 152 MG/DL (ref 0–100)
LYMPHOCYTES # BLD AUTO: 1.84 THOUSANDS/ÂΜL (ref 0.6–4.47)
LYMPHOCYTES NFR BLD AUTO: 44 % (ref 14–44)
MCH RBC QN AUTO: 31.4 PG (ref 26.8–34.3)
MCHC RBC AUTO-ENTMCNC: 34 G/DL (ref 31.4–37.4)
MCV RBC AUTO: 92 FL (ref 82–98)
MONOCYTES # BLD AUTO: 0.49 THOUSAND/ÂΜL (ref 0.17–1.22)
MONOCYTES NFR BLD AUTO: 12 % (ref 4–12)
NEUTROPHILS # BLD AUTO: 1.68 THOUSANDS/ÂΜL (ref 1.85–7.62)
NEUTS SEG NFR BLD AUTO: 41 % (ref 43–75)
NONHDLC SERPL-MCNC: 168 MG/DL
NRBC BLD AUTO-RTO: 0 /100 WBCS
PLATELET # BLD AUTO: 265 THOUSANDS/UL (ref 149–390)
PMV BLD AUTO: 9.7 FL (ref 8.9–12.7)
POTASSIUM SERPL-SCNC: 4.2 MMOL/L (ref 3.5–5.3)
PROT SERPL-MCNC: 6.8 G/DL (ref 6.4–8.4)
RBC # BLD AUTO: 4.94 MILLION/UL (ref 3.88–5.62)
SODIUM SERPL-SCNC: 136 MMOL/L (ref 135–147)
TRIGL SERPL-MCNC: 78 MG/DL
TSH SERPL DL<=0.05 MIU/L-ACNC: 1.46 UIU/ML (ref 0.45–4.5)
WBC # BLD AUTO: 4.11 THOUSAND/UL (ref 4.31–10.16)

## 2024-04-18 PROCEDURE — 36415 COLL VENOUS BLD VENIPUNCTURE: CPT

## 2024-04-18 PROCEDURE — 80053 COMPREHEN METABOLIC PANEL: CPT

## 2024-04-18 PROCEDURE — 85025 COMPLETE CBC W/AUTO DIFF WBC: CPT

## 2024-04-18 PROCEDURE — 83036 HEMOGLOBIN GLYCOSYLATED A1C: CPT

## 2024-04-18 PROCEDURE — 80061 LIPID PANEL: CPT

## 2024-04-18 PROCEDURE — 82306 VITAMIN D 25 HYDROXY: CPT

## 2024-04-18 PROCEDURE — 84443 ASSAY THYROID STIM HORMONE: CPT

## 2024-04-19 LAB — 25(OH)D3 SERPL-MCNC: 36.9 NG/ML (ref 30–100)

## 2024-07-21 ENCOUNTER — HOSPITAL ENCOUNTER (EMERGENCY)
Facility: HOSPITAL | Age: 34
Discharge: HOME | End: 2024-07-21
Attending: EMERGENCY MEDICINE
Payer: COMMERCIAL

## 2024-07-21 ENCOUNTER — APPOINTMENT (EMERGENCY)
Dept: CT IMAGING | Facility: HOSPITAL | Age: 34
End: 2024-07-21
Payer: COMMERCIAL

## 2024-07-21 VITALS
HEART RATE: 77 BPM | TEMPERATURE: 98.5 F | OXYGEN SATURATION: 98 % | DIASTOLIC BLOOD PRESSURE: 65 MMHG | SYSTOLIC BLOOD PRESSURE: 131 MMHG | RESPIRATION RATE: 18 BRPM

## 2024-07-21 VITALS
RESPIRATION RATE: 18 BRPM | SYSTOLIC BLOOD PRESSURE: 129 MMHG | DIASTOLIC BLOOD PRESSURE: 76 MMHG | TEMPERATURE: 98 F | OXYGEN SATURATION: 100 % | HEART RATE: 80 BPM

## 2024-07-21 DIAGNOSIS — A02.9 SALMONELLA: ICD-10-CM

## 2024-07-21 DIAGNOSIS — R19.7 DIARRHEA OF PRESUMED INFECTIOUS ORIGIN: Primary | ICD-10-CM

## 2024-07-21 LAB
ALBUMIN SERPL BCG-MCNC: 4 G/DL (ref 3.5–5)
ALP SERPL-CCNC: 61 U/L (ref 34–104)
ALT SERPL W P-5'-P-CCNC: 50 U/L (ref 7–52)
ANION GAP SERPL CALCULATED.3IONS-SCNC: 6 MMOL/L (ref 4–13)
AST SERPL W P-5'-P-CCNC: 29 U/L (ref 13–39)
BASOPHILS # BLD AUTO: 0.02 THOUSANDS/ÂΜL (ref 0–0.1)
BASOPHILS NFR BLD AUTO: 0 % (ref 0–1)
BILIRUB SERPL-MCNC: 0.54 MG/DL (ref 0.2–1)
BUN SERPL-MCNC: 9 MG/DL (ref 5–25)
CALCIUM SERPL-MCNC: 9.1 MG/DL (ref 8.4–10.2)
CHLORIDE SERPL-SCNC: 104 MMOL/L (ref 96–108)
CO2 SERPL-SCNC: 25 MMOL/L (ref 21–32)
CREAT SERPL-MCNC: 0.81 MG/DL (ref 0.6–1.3)
EOSINOPHIL # BLD AUTO: 0.21 THOUSAND/ÂΜL (ref 0–0.61)
EOSINOPHIL NFR BLD AUTO: 3 % (ref 0–6)
ERYTHROCYTE [DISTWIDTH] IN BLOOD BY AUTOMATED COUNT: 12.5 % (ref 11.6–15.1)
GFR SERPL CREATININE-BSD FRML MDRD: 115 ML/MIN/1.73SQ M
GLUCOSE SERPL-MCNC: 93 MG/DL (ref 65–140)
HCT VFR BLD AUTO: 41.1 % (ref 36.5–49.3)
HGB BLD-MCNC: 14.4 G/DL (ref 12–17)
IMM GRANULOCYTES # BLD AUTO: 0.02 THOUSAND/UL (ref 0–0.2)
IMM GRANULOCYTES NFR BLD AUTO: 0 % (ref 0–2)
LYMPHOCYTES # BLD AUTO: 0.56 THOUSANDS/ÂΜL (ref 0.6–4.47)
LYMPHOCYTES NFR BLD AUTO: 9 % (ref 14–44)
MAGNESIUM SERPL-MCNC: 1.7 MG/DL (ref 1.9–2.7)
MCH RBC QN AUTO: 31.3 PG (ref 26.8–34.3)
MCHC RBC AUTO-ENTMCNC: 35 G/DL (ref 31.4–37.4)
MCV RBC AUTO: 89 FL (ref 82–98)
MONOCYTES # BLD AUTO: 0.33 THOUSAND/ÂΜL (ref 0.17–1.22)
MONOCYTES NFR BLD AUTO: 5 % (ref 4–12)
NEUTROPHILS # BLD AUTO: 5.28 THOUSANDS/ÂΜL (ref 1.85–7.62)
NEUTS SEG NFR BLD AUTO: 83 % (ref 43–75)
NRBC BLD AUTO-RTO: 0 /100 WBCS
PLATELET # BLD AUTO: 197 THOUSANDS/UL (ref 149–390)
PMV BLD AUTO: 8.5 FL (ref 8.9–12.7)
POTASSIUM SERPL-SCNC: 3.8 MMOL/L (ref 3.5–5.3)
PROT SERPL-MCNC: 6.6 G/DL (ref 6.4–8.4)
RBC # BLD AUTO: 4.6 MILLION/UL (ref 3.88–5.62)
SODIUM SERPL-SCNC: 135 MMOL/L (ref 135–147)
WBC # BLD AUTO: 6.42 THOUSAND/UL (ref 4.31–10.16)

## 2024-07-21 PROCEDURE — 74177 CT ABD & PELVIS W/CONTRAST: CPT

## 2024-07-21 PROCEDURE — 87077 CULTURE AEROBIC IDENTIFY: CPT

## 2024-07-21 PROCEDURE — 96361 HYDRATE IV INFUSION ADD-ON: CPT

## 2024-07-21 PROCEDURE — 99284 EMERGENCY DEPT VISIT MOD MDM: CPT

## 2024-07-21 PROCEDURE — 85025 COMPLETE CBC W/AUTO DIFF WBC: CPT

## 2024-07-21 PROCEDURE — 36415 COLL VENOUS BLD VENIPUNCTURE: CPT

## 2024-07-21 PROCEDURE — 80053 COMPREHEN METABOLIC PANEL: CPT

## 2024-07-21 PROCEDURE — 96374 THER/PROPH/DIAG INJ IV PUSH: CPT

## 2024-07-21 PROCEDURE — 99284 EMERGENCY DEPT VISIT MOD MDM: CPT | Performed by: EMERGENCY MEDICINE

## 2024-07-21 PROCEDURE — 96375 TX/PRO/DX INJ NEW DRUG ADDON: CPT

## 2024-07-21 PROCEDURE — 83735 ASSAY OF MAGNESIUM: CPT

## 2024-07-21 PROCEDURE — 87505 NFCT AGENT DETECTION GI: CPT

## 2024-07-21 PROCEDURE — 87186 SC STD MICRODIL/AGAR DIL: CPT

## 2024-07-21 PROCEDURE — 99285 EMERGENCY DEPT VISIT HI MDM: CPT | Performed by: EMERGENCY MEDICINE

## 2024-07-21 RX ORDER — CIPROFLOXACIN 500 MG/1
500 TABLET, FILM COATED ORAL ONCE
Status: COMPLETED | OUTPATIENT
Start: 2024-07-21 | End: 2024-07-21

## 2024-07-21 RX ORDER — KETOROLAC TROMETHAMINE 30 MG/ML
15 INJECTION, SOLUTION INTRAMUSCULAR; INTRAVENOUS ONCE
Status: COMPLETED | OUTPATIENT
Start: 2024-07-21 | End: 2024-07-21

## 2024-07-21 RX ORDER — LANOLIN ALCOHOL/MO/W.PET/CERES
400 CREAM (GRAM) TOPICAL ONCE
Status: COMPLETED | OUTPATIENT
Start: 2024-07-21 | End: 2024-07-21

## 2024-07-21 RX ORDER — DICYCLOMINE HCL 20 MG
20 TABLET ORAL ONCE
Status: COMPLETED | OUTPATIENT
Start: 2024-07-21 | End: 2024-07-21

## 2024-07-21 RX ORDER — CIPROFLOXACIN 500 MG/1
500 TABLET, FILM COATED ORAL 2 TIMES DAILY
Qty: 13 TABLET | Refills: 0 | Status: SHIPPED | OUTPATIENT
Start: 2024-07-21 | End: 2024-07-28

## 2024-07-21 RX ORDER — ONDANSETRON 2 MG/ML
4 INJECTION INTRAMUSCULAR; INTRAVENOUS ONCE
Status: COMPLETED | OUTPATIENT
Start: 2024-07-21 | End: 2024-07-21

## 2024-07-21 RX ADMIN — DICYCLOMINE HYDROCHLORIDE 20 MG: 20 TABLET ORAL at 09:40

## 2024-07-21 RX ADMIN — IOHEXOL 100 ML: 350 INJECTION, SOLUTION INTRAVENOUS at 17:49

## 2024-07-21 RX ADMIN — KETOROLAC TROMETHAMINE 15 MG: 30 INJECTION, SOLUTION INTRAMUSCULAR at 16:48

## 2024-07-21 RX ADMIN — Medication 400 MG: at 10:54

## 2024-07-21 RX ADMIN — SODIUM CHLORIDE 500 ML: 0.9 INJECTION, SOLUTION INTRAVENOUS at 16:46

## 2024-07-21 RX ADMIN — ONDANSETRON 4 MG: 2 INJECTION INTRAMUSCULAR; INTRAVENOUS at 16:46

## 2024-07-21 RX ADMIN — ONDANSETRON 4 MG: 2 INJECTION INTRAMUSCULAR; INTRAVENOUS at 09:34

## 2024-07-21 RX ADMIN — SODIUM CHLORIDE 1000 ML: 0.9 INJECTION, SOLUTION INTRAVENOUS at 09:34

## 2024-07-21 RX ADMIN — CIPROFLOXACIN 500 MG: 500 TABLET ORAL at 10:54

## 2024-07-21 NOTE — ED PROVIDER NOTES
History  Chief Complaint   Patient presents with    Abdominal Pain     Patient arrives to the Ed with complain of worsening abdominal pain. Patient states he was just seen at [facility], was prescribed medication for salmonella poisoning, (wife also sick) but as soon as he was discharged his pain became worse.      33-year-old male presents to the emergency department for evaluation of abdominal pain.  Patient reports that he was seen at a different emergency department earlier today and diagnosed with Salmonella poisoning.  Reports that his significant other was diagnosed with the same thing last week.  Reports worsening abdominal pain since being discharged so came here to the emergency department for further evaluation.  The patient reports that last week there was a family cookout and afterwards several members became sick.  He reports over the past several days having subjective fevers, fatigue, vomiting and diarrhea.  He states that he had a dental procedure 3 days ago and has been taking amoxicillin.  He denies headache, blurry vision, neck pain, recent travel and urinary symptoms.        Prior to Admission Medications   Prescriptions Last Dose Informant Patient Reported? Taking?   buPROPion (WELLBUTRIN XL) 150 mg 24 hr tablet   No No   Sig: Take 1 tablet (150 mg total) by mouth every morning   ciprofloxacin (CIPRO) 500 mg tablet   No No   Sig: Take 1 tablet (500 mg total) by mouth 2 (two) times a day for 7 days   nicotine (NICODERM CQ) 7 mg/24hr TD 24 hr patch   No No   Sig: Place 1 patch on the skin over 16 hours every 24 hours   varenicline 0.5 MG X 11 & 1 MG X 42 tablet therapy pack   No No   Sig: Take 0.5 mg by mouth daily for 3 days, THEN 0.5 mg 2 (two) times a day for 4 days, THEN 1 mg 2 (two) times a day.   Patient not taking: Reported on 3/4/2024      Facility-Administered Medications: None       History reviewed. No pertinent past medical history.    History reviewed. No pertinent surgical  history.    Family History   Problem Relation Age of Onset    Cancer Paternal Grandmother     Cancer Paternal Grandfather     Cancer Maternal Aunt     Cervical cancer Paternal Aunt      I have reviewed and agree with the history as documented.    E-Cigarette/Vaping    E-Cigarette Use Never User      E-Cigarette/Vaping Substances     Social History     Tobacco Use    Smoking status: Every Day    Smokeless tobacco: Never   Vaping Use    Vaping status: Never Used   Substance Use Topics    Alcohol use: Yes     Alcohol/week: 3.0 standard drinks of alcohol     Types: 3 Cans of beer per week    Drug use: Yes     Types: Marijuana       Review of Systems   Constitutional:  Positive for chills, fatigue and fever.   HENT:  Negative for ear pain and sore throat.    Eyes:  Negative for pain and visual disturbance.   Respiratory:  Negative for cough and shortness of breath.    Cardiovascular:  Negative for chest pain and palpitations.   Gastrointestinal:  Positive for abdominal pain, diarrhea and vomiting.   Genitourinary:  Negative for dysuria and hematuria.   Musculoskeletal:  Negative for arthralgias and back pain.   Skin:  Negative for color change and rash.   Neurological:  Negative for seizures and syncope.   All other systems reviewed and are negative.      Physical Exam  Physical Exam  Vitals and nursing note reviewed.   Constitutional:       General: He is not in acute distress.     Appearance: He is well-developed.   HENT:      Head: Normocephalic and atraumatic.   Eyes:      Conjunctiva/sclera: Conjunctivae normal.   Cardiovascular:      Rate and Rhythm: Normal rate and regular rhythm.      Heart sounds: No murmur heard.  Pulmonary:      Effort: Pulmonary effort is normal. No respiratory distress.      Breath sounds: Normal breath sounds.   Abdominal:      General: There is no distension.      Palpations: Abdomen is soft.      Tenderness: There is generalized abdominal tenderness. There is no guarding or rebound.    Musculoskeletal:         General: No swelling.      Cervical back: Neck supple.   Skin:     General: Skin is warm and dry.      Capillary Refill: Capillary refill takes less than 2 seconds.   Neurological:      Mental Status: He is alert and oriented to person, place, and time.      GCS: GCS eye subscore is 4. GCS verbal subscore is 5. GCS motor subscore is 6.      Cranial Nerves: Cranial nerves 2-12 are intact.      Sensory: Sensation is intact.      Motor: Motor function is intact.      Coordination: Coordination is intact.   Psychiatric:         Mood and Affect: Mood normal.         Vital Signs  ED Triage Vitals   Temperature Pulse Respirations Blood Pressure SpO2   07/21/24 1702 07/21/24 1618 07/21/24 1618 07/21/24 1618 07/21/24 1618   98 °F (36.7 °C) 67 20 130/71 100 %      Temp Source Heart Rate Source Patient Position - Orthostatic VS BP Location FiO2 (%)   07/21/24 1702 07/21/24 1618 07/21/24 1618 07/21/24 1618 --   Oral Monitor Lying Left arm       Pain Score       07/21/24 1618       10 - Worst Possible Pain           Vitals:    07/21/24 1618 07/21/24 1843   BP: 130/71 129/76   Pulse: 67 80   Patient Position - Orthostatic VS: Lying Lying         Visual Acuity      ED Medications  Medications   sodium chloride 0.9 % bolus 500 mL (0 mL Intravenous Stopped 7/21/24 1836)   ondansetron (ZOFRAN) injection 4 mg (4 mg Intravenous Given 7/21/24 1646)   ketorolac (TORADOL) injection 15 mg (15 mg Intravenous Given 7/21/24 1648)   iohexol (OMNIPAQUE) 350 MG/ML injection (SINGLE-DOSE) 100 mL (100 mL Intravenous Given 7/21/24 1749)       Diagnostic Studies  Results Reviewed       None                   CT abdomen pelvis with contrast   Final Result by Heath Villavicencio MD (07/21 1835)      Moderate diffuse colonic wall thickening with mild surrounding inflammatory stranding suspicious for an infectious or inflammatory colitis.      Trace pelvic free fluid. No free intraperitoneal air.      No large or small bowel  obstruction.         Workstation performed: [id number]                    Procedures  Procedures         ED Course                     Medical Decision Making  33-year-old male presented to the emergency department for evaluation of abdominal pain.  On arrival the patient was awake, alert, oriented and in no acute distress.  Initial vital signs were within normal limits.  On exam the patient had mild generalized abdominal tenderness to palpation.  Patient was seen earlier today in a different emergency department.  Labs reviewed.  Further treatment and diagnostic options were discussed with the patient in detail.  CT scan was ordered to evaluate for acute pathology including but not limited to obstruction, perforation, infection, abscess.  Patient treated symptomatically with a fluid bolus, Zofran and Toradol.  On reevaluation the patient reported improvement of symptoms.  CT scan showed moderate diffuse colonic wall thickening with mild surrounding inflammatory stranding suspicious for an infectious or inflammatory colitis.  No obstruction or perforation.  All diagnostic studies were discussed with the patient in detail.  Stool studies are pending from his previous ER visit.  Patient was also previously prescribed ciprofloxacin.  The patient is appropriate for discharge.  Recommendation was made for the patient to follow-up with his PCP for continued symptoms.  Return precautions were discussed.    Patient agrees with the plan for discharge and feels comfortable to go home with proper f/u. Advised to return for worsening or additional problems. Diagnostic tests were reviewed and questions answered. Diagnosis, care plan and treatment options were discussed. The patient understands instructions and will follow up as directed.        Amount and/or Complexity of Data Reviewed  External Data Reviewed: labs and notes.     Details: Prior labs and notes reviewed  Radiology: ordered.    Risk  Prescription drug  management.                 Disposition  Final diagnoses:   Diarrhea of presumed infectious origin     Time reflects when diagnosis was documented in both MDM as applicable and the Disposition within this note       Time User Action Codes Description Comment    7/21/2024  6:44 PM Yonas Mariano Add [R19.7] Diarrhea of presumed infectious origin           ED Disposition       ED Disposition   Discharge    Condition   Stable    Date/Time   Sun Jul 21, 2024  6:44 PM    Comment   Ryan Cifuenteslolila discharge to home/self care.                   Follow-up Information       Follow up With Specialties Details Why Contact Info Additional Information    Alexandr Yadav MD Internal Medicine Schedule an appointment as soon as possible for a visit   [address] [facility] [address] [phone]       [facility] Emergency Medicine Go to  If symptoms worsen [address] [address] [phone] [facility], [address] [address]            Discharge Medication List as of 7/21/2024  6:51 PM        CONTINUE these medications which have NOT CHANGED    Details   buPROPion (WELLBUTRIN XL) 150 mg 24 hr tablet Take 1 tablet (150 mg total) by mouth every morning, Starting Mon 3/4/2024, Until Sat 8/31/2024, Normal      ciprofloxacin (CIPRO) 500 mg tablet Take 1 tablet (500 mg total) by mouth 2 (two) times a day for 7 days, Starting Sun 7/21/2024, Until Sun 7/28/2024, Normal      nicotine (NICODERM CQ) 7 mg/24hr TD 24 hr patch Place 1 patch on the skin over 16 hours every 24 hours, Starting Mon 3/4/2024, Normal      varenicline 0.5 MG X 11 & 1 MG X 42 tablet therapy pack Multiple Dosages:Starting Thu 12/1/2022, Until Sat 12/3/2022 at 2359, THEN Starting Sun 12/4/2022, Until Wed 12/7/2022 at 2359, THEN Starting Thu 12/8/2022, Until Fri 1/6/2023 at 2359Take 0.5 mg by mouth daily for 3 days, THEN 0.5 mg 2 (two)  times a  day for 4 days, THEN 1 mg 2 (two) times a day., Normal             No discharge procedures on file.    PDMP Review       None            ED Provider  Electronically Signed by             Yonas Mariano MD  07/21/24 6783

## 2024-07-21 NOTE — ED PROVIDER NOTES
History  Chief Complaint   Patient presents with    Dizziness     Pt reports dizziness starting Monday. Last night pt started having diarrhea, chills, fevers, and abdominal pain. Had dental procedure on Thursday and is on abx for that. Took ibuprofen around 7     33-year-old otherwise healthy male presenting with dizziness, abdominal pain and nausea vomiting for about 1 week.  Patient had a cookout earlier last week and afterwards several family members have had similar symptoms.  Reports subjective fever and episode of vomiting and 1 episode of bloody diarrhea.  Diarrhea has otherwise been dark brown.  Patient has been taking ibuprofen and a course of amoxicillin for a dental operation he had last Thursday.  No chest pain, shortness of breath, or other acute symptoms.      Dizziness  Associated symptoms: diarrhea and nausea    Associated symptoms: no chest pain, no palpitations, no shortness of breath and no vomiting        Prior to Admission Medications   Prescriptions Last Dose Informant Patient Reported? Taking?   buPROPion (WELLBUTRIN XL) 150 mg 24 hr tablet   No No   Sig: Take 1 tablet (150 mg total) by mouth every morning   nicotine (NICODERM CQ) 7 mg/24hr TD 24 hr patch   No No   Sig: Place 1 patch on the skin over 16 hours every 24 hours   varenicline 0.5 MG X 11 & 1 MG X 42 tablet therapy pack   No No   Sig: Take 0.5 mg by mouth daily for 3 days, THEN 0.5 mg 2 (two) times a day for 4 days, THEN 1 mg 2 (two) times a day.   Patient not taking: Reported on 3/4/2024      Facility-Administered Medications: None       No past medical history on file.    No past surgical history on file.    Family History   Problem Relation Age of Onset    Cancer Paternal Grandmother     Cancer Paternal Grandfather     Cancer Maternal Aunt     Cervical cancer Paternal Aunt      I have reviewed and agree with the history as documented.    E-Cigarette/Vaping    E-Cigarette Use Never User      E-Cigarette/Vaping Substances      Social History     Tobacco Use    Smoking status: Every Day    Smokeless tobacco: Never   Vaping Use    Vaping status: Never Used   Substance Use Topics    Alcohol use: Yes     Alcohol/week: 3.0 standard drinks of alcohol     Types: 3 Cans of beer per week    Drug use: Yes     Types: Marijuana        Review of Systems   Constitutional:  Negative for chills and fever.   HENT:  Negative for ear pain and sore throat.    Eyes:  Negative for pain and visual disturbance.   Respiratory:  Negative for cough and shortness of breath.    Cardiovascular:  Negative for chest pain and palpitations.   Gastrointestinal:  Positive for abdominal pain, diarrhea and nausea. Negative for vomiting.   Genitourinary:  Negative for dysuria and hematuria.   Musculoskeletal:  Negative for arthralgias and back pain.   Skin:  Negative for color change and rash.   Neurological:  Positive for dizziness. Negative for seizures and syncope.   All other systems reviewed and are negative.      Physical Exam  ED Triage Vitals [07/21/24 0859]   Temperature Pulse Respirations Blood Pressure SpO2   98.5 °F (36.9 °C) 87 18 131/65 97 %      Temp Source Heart Rate Source Patient Position - Orthostatic VS BP Location FiO2 (%)   Oral Monitor -- Right arm --      Pain Score       7             Orthostatic Vital Signs  Vitals:    07/21/24 0859 07/21/24 0938   BP: 131/65    Pulse: 87 77       Physical Exam  Vitals and nursing note reviewed.   Constitutional:       General: He is not in acute distress.     Appearance: He is well-developed.   HENT:      Head: Normocephalic and atraumatic.   Eyes:      Conjunctiva/sclera: Conjunctivae normal.   Cardiovascular:      Rate and Rhythm: Normal rate and regular rhythm.      Heart sounds: No murmur heard.  Pulmonary:      Effort: Pulmonary effort is normal. No respiratory distress.      Breath sounds: Normal breath sounds.   Abdominal:      General: Bowel sounds are normal.      Palpations: Abdomen is soft. There is no  mass.      Tenderness: There is abdominal tenderness (to deep palpation, generally.). There is no guarding or rebound.   Musculoskeletal:         General: No swelling.      Cervical back: Neck supple.   Skin:     General: Skin is warm and dry.      Capillary Refill: Capillary refill takes less than 2 seconds.   Neurological:      Mental Status: He is alert.   Psychiatric:         Mood and Affect: Mood normal.         ED Medications  Medications   ondansetron (ZOFRAN) injection 4 mg (4 mg Intravenous Given 7/21/24 0934)   dicyclomine (BENTYL) tablet 20 mg (20 mg Oral Given 7/21/24 0940)   sodium chloride 0.9 % bolus 1,000 mL (0 mL Intravenous Stopped 7/21/24 1056)   magnesium Oxide (MAG-OX) tablet 400 mg (400 mg Oral Given 7/21/24 1054)   ciprofloxacin (CIPRO) tablet 500 mg (500 mg Oral Given 7/21/24 1054)       Diagnostic Studies  Results Reviewed       Procedure Component Value Units Date/Time    Comprehensive metabolic panel [[id number]] Collected: 07/21/24 0931    Lab Status: Final result Specimen: Blood from Arm, Right Updated: 07/21/24 1001     Sodium 135 mmol/L      Potassium 3.8 mmol/L      Chloride 104 mmol/L      CO2 25 mmol/L      ANION GAP 6 mmol/L      BUN 9 mg/dL      Creatinine 0.81 mg/dL      Glucose 93 mg/dL      Calcium 9.1 mg/dL      AST 29 U/L      ALT 50 U/L      Alkaline Phosphatase 61 U/L      Total Protein 6.6 g/dL      Albumin 4.0 g/dL      Total Bilirubin 0.54 mg/dL      eGFR 115 ml/min/1.73sq m     Narrative:      National Kidney Disease Foundation guidelines for Chronic Kidney Disease (CKD):     Stage 1 with normal or high GFR (GFR > 90 mL/min/1.73 square meters)    Stage 2 Mild CKD (GFR = 60-89 mL/min/1.73 square meters)    Stage 3A Moderate CKD (GFR = 45-59 mL/min/1.73 square meters)    Stage 3B Moderate CKD (GFR = 30-44 mL/min/1.73 square meters)    Stage 4 Severe CKD (GFR = 15-29 mL/min/1.73 square meters)    Stage 5 End Stage CKD (GFR <15 mL/min/1.73 square meters)  Note: GFR  calculation is accurate only with a steady state creatinine    Magnesium [[id number]]  (Abnormal) Collected: 07/21/24 0931    Lab Status: Final result Specimen: Blood from Arm, Right Updated: 07/21/24 1001     Magnesium 1.7 mg/dL     Stool Enteric Bacterial Panel by PCR [[id number]] Collected: 07/21/24 0952    Lab Status: In process Specimen: Stool from Rectum Updated: 07/21/24 0958    CBC and differential [[id number]]  (Abnormal) Collected: 07/21/24 0931    Lab Status: Final result Specimen: Blood from Arm, Right Updated: 07/21/24 0946     WBC 6.42 Thousand/uL      RBC 4.60 Million/uL      Hemoglobin 14.4 g/dL      Hematocrit 41.1 %      MCV 89 fL      MCH 31.3 pg      MCHC 35.0 g/dL      RDW 12.5 %      MPV 8.5 fL      Platelets 197 Thousands/uL      nRBC 0 /100 WBCs      Segmented % 83 %      Immature Grans % 0 %      Lymphocytes % 9 %      Monocytes % 5 %      Eosinophils Relative 3 %      Basophils Relative 0 %      Absolute Neutrophils 5.28 Thousands/µL      Absolute Immature Grans 0.02 Thousand/uL      Absolute Lymphocytes 0.56 Thousands/µL      Absolute Monocytes 0.33 Thousand/µL      Eosinophils Absolute 0.21 Thousand/µL      Basophils Absolute 0.02 Thousands/µL                    No orders to display         Procedures  Procedures      ED Course  ED Course as of 07/21/24 1451   Sun Jul 21, 2024   0923 33-year-old otherwise healthy male presenting with dizziness, abdominal pain and nausea vomiting for about 1 week.  Patient had a cookout earlier last week and afterwards several family members have had similar symptoms.  Reports subjective fever and episode of vomiting and 1 episode of bloody diarrhea.  Diarrhea has otherwise been dark brown.  Patient has been taking ibuprofen and a course of amoxicillin for a dental operation he had last Thursday.  No chest pain, shortness of breath, or other acute symptoms.   1048 Patient provided dose of Zofran and Bentyl and 1 L normal saline in the ED.  CBC CMP within  normal limits.  Magnesium 1.7, repletion provided.  Given patient's persistent symptoms, we will prescribe 500 mg of ciprofloxacin.  Plan to discharge home.  Recurrent return precautions provided                             SBIRT 22yo+      Flowsheet Row Most Recent Value   Initial Alcohol Screen: US AUDIT-C     1. How often do you have a drink containing alcohol? 0 Filed at: 07/21/2024 1025   2. How many drinks containing alcohol do you have on a typical day you are drinking?  0 Filed at: 07/21/2024 1025   3a. Male UNDER 65: How often do you have five or more drinks on one occasion? 0 Filed at: 07/21/2024 1025   3b. FEMALE Any Age, or MALE 65+: How often do you have 4 or more drinks on one occassion? 0 Filed at: 07/21/2024 1025   Audit-C Score 0 Filed at: 07/21/2024 1025   KRISS: How many times in the past year have you...    Used an illegal drug or used a prescription medication for non-medical reasons? Never Filed at: 07/21/2024 1025                  Medical Decision Making  See ED Course    Amount and/or Complexity of Data Reviewed  Labs: ordered.    Risk  OTC drugs.  Prescription drug management.          Disposition  Final diagnoses:   Diarrhea of presumed infectious origin     Time reflects when diagnosis was documented in both MDM as applicable and the Disposition within this note       Time User Action Codes Description Comment    7/21/2024 10:41 AM Bebeto Christian Add [R19.7] Diarrhea of presumed infectious origin           ED Disposition       ED Disposition   Discharge    Condition   Stable    Date/Time   Sun Jul 21, 2024 1041    Comment   Ryan Lu discharge to home/self care.                   Follow-up Information    None         Discharge Medication List as of 7/21/2024 10:42 AM        START taking these medications    Details   ciprofloxacin (CIPRO) 500 mg tablet Take 1 tablet (500 mg total) by mouth 2 (two) times a day for 7 days, Starting Sun 7/21/2024, Until Sun 7/28/2024, Normal            CONTINUE these medications which have NOT CHANGED    Details   buPROPion (WELLBUTRIN XL) 150 mg 24 hr tablet Take 1 tablet (150 mg total) by mouth every morning, Starting Mon 3/4/2024, Until Sat 8/31/2024, Normal      nicotine (NICODERM CQ) 7 mg/24hr TD 24 hr patch Place 1 patch on the skin over 16 hours every 24 hours, Starting Mon 3/4/2024, Normal      varenicline 0.5 MG X 11 & 1 MG X 42 tablet therapy pack Multiple Dosages:Starting Thu 12/1/2022, Until Sat 12/3/2022 at 2359, THEN Starting Sun 12/4/2022, Until Wed 12/7/2022 at 2359, THEN Starting Thu 12/8/2022, Until Fri 1/6/2023 at 2359Take 0.5 mg by mouth daily for 3 days, THEN 0.5 mg 2 (two) times a  day for 4 days, THEN 1 mg 2 (two) times a day., Normal           No discharge procedures on file.    PDMP Review       None             ED Provider  Attending physically available and evaluated Ryan Cifuentesmark. I managed the patient along with the ED Attending.    Electronically Signed by           Khris Parish MD  07/21/24 9575

## 2024-07-22 LAB
C COLI+JEJUNI TUF STL QL NAA+PROBE: NEGATIVE
EC STX1+STX2 GENES STL QL NAA+PROBE: NEGATIVE
SALMONELLA SP SPAO STL QL NAA+PROBE: POSITIVE
SHIGELLA SP+EIEC IPAH STL QL NAA+PROBE: NEGATIVE

## 2024-07-22 NOTE — ED ATTENDING ATTESTATION
7/21/2024  IBebeto DO, saw and evaluated the patient. I have discussed the patient with the resident/non-physician practitioner and agree with the resident's/non-physician practitioner's findings, Plan of Care, and MDM as documented in the resident's/non-physician practitioner's note, except where noted. All available labs and Radiology studies were reviewed.  I was present for key portions of any procedure(s) performed by the resident/non-physician practitioner and I was immediately available to provide assistance.       At this point I agree with the current assessment done in the Emergency Department.  I have conducted an independent evaluation of this patient a history and physical is as follows:    32 yo M with diarrhea, abdominal pain. He's had abdominal pain for 1 week, but diarrhea just started over the past 1-2 days. Diarrhea has been bloody once. His wife just tested positive for salmonella and started on antibiotics, salmonella.    PE:  The patient is well appearing, non-toxic, in NAD. Head: normocephalic, atraumatic. HEENT: mucous membranes moist.  Lungs: CTA b/l, no resp distress. Heart: RRR. No M/R/G. Abdomen: mild diffuse abdominal tenderness, ND, no R/R/G. Neuro: CN2-12 intact, GCS 15. Normal strength and sensation, normal speech and gait. Cap refill < 2 sec, skin warm and dry. No rashes or lesions.    ED workup for suspected gastroenteritis, food poisoning, in particular salmonella given he and his wife have similar symptoms, and she tested positive for salmonella.  Stool provided and sent for enteric panel to test for salmonella.  Labs benign except mild hypomagnesemic.  Start on cipro for presumed salmonella poisoning. Stable for d/c home.      ED Course         Critical Care Time  Procedures

## 2024-07-22 NOTE — RESULT ENCOUNTER NOTE
I left a message on the patient's machine to check my chart.  Patient is positive for salmonella.  He was prescribed Cipro.

## 2024-07-26 LAB — CULTURE ID FROM ENTERIC PCR: ABNORMAL

## 2024-08-02 LAB — CULTURE ID FROM ENTERIC PCR: ABNORMAL

## 2025-04-17 ENCOUNTER — TELEPHONE (OUTPATIENT)
Dept: INTERNAL MEDICINE CLINIC | Facility: CLINIC | Age: 35
End: 2025-04-17

## 2025-04-17 NOTE — TELEPHONE ENCOUNTER
I left message for patient to call back concerning his appointment this afternoon. Dr. Yadav has ordered a TB blood test, so he needs to have that done first and results will take about 2-3days. He needs to reschedule his appointment.

## 2025-05-06 ENCOUNTER — APPOINTMENT (OUTPATIENT)
Dept: LAB | Facility: CLINIC | Age: 35
End: 2025-05-06
Payer: COMMERCIAL

## 2025-05-06 DIAGNOSIS — Z22.7 LTBI (LATENT TUBERCULOSIS INFECTION): ICD-10-CM

## 2025-05-06 PROCEDURE — 36415 COLL VENOUS BLD VENIPUNCTURE: CPT

## 2025-05-06 PROCEDURE — 86480 TB TEST CELL IMMUN MEASURE: CPT

## 2025-05-07 LAB
GAMMA INTERFERON BACKGROUND BLD IA-ACNC: 0.06 IU/ML
M TB IFN-G BLD-IMP: NEGATIVE
M TB IFN-G CD4+ BCKGRND COR BLD-ACNC: 0.02 IU/ML
M TB IFN-G CD4+ BCKGRND COR BLD-ACNC: 0.05 IU/ML
MITOGEN IGNF BCKGRD COR BLD-ACNC: 9.94 IU/ML

## 2025-08-20 ENCOUNTER — TRANSCRIBE ORDERS (OUTPATIENT)
Dept: LAB | Facility: CLINIC | Age: 35
End: 2025-08-20

## 2025-08-20 DIAGNOSIS — Z00.8 HEALTH EXAMINATION IN POPULATION SURVEY: Primary | ICD-10-CM
